# Patient Record
Sex: FEMALE | Race: WHITE | NOT HISPANIC OR LATINO | Employment: OTHER | URBAN - METROPOLITAN AREA
[De-identification: names, ages, dates, MRNs, and addresses within clinical notes are randomized per-mention and may not be internally consistent; named-entity substitution may affect disease eponyms.]

---

## 2022-06-18 ENCOUNTER — HOSPITAL ENCOUNTER (EMERGENCY)
Facility: CLINIC | Age: 67
Discharge: HOME | End: 2022-06-18
Attending: EMERGENCY MEDICINE | Admitting: EMERGENCY MEDICINE
Payer: COMMERCIAL

## 2022-06-18 VITALS
OXYGEN SATURATION: 98 % | DIASTOLIC BLOOD PRESSURE: 76 MMHG | HEART RATE: 79 BPM | SYSTOLIC BLOOD PRESSURE: 148 MMHG | RESPIRATION RATE: 20 BRPM | TEMPERATURE: 97.9 F

## 2022-06-18 DIAGNOSIS — H33.22 RETINAL DETACHMENT, LEFT: ICD-10-CM

## 2022-06-18 DIAGNOSIS — H43.392 VISUAL FLOATERS, LEFT: ICD-10-CM

## 2022-06-18 PROCEDURE — 76512 OPH US DX B-SCAN: CPT

## 2022-06-18 PROCEDURE — 99284 EMERGENCY DEPT VISIT MOD MDM: CPT | Mod: 25

## 2022-06-18 ASSESSMENT — ENCOUNTER SYMPTOMS: EYE PAIN: 0

## 2022-06-19 NOTE — ED TRIAGE NOTES
"Pt arrives to ED with c/o L vision changes at 1630. Pt had \"a curtain fall and floaters\" in her L visual field. Hx cataract surgery 3 years ago. RA on immunosuppressants. Denies pain. Denies nausea,m dizziness, or HA.     "

## 2022-06-19 NOTE — ED PROVIDER NOTES
"  History   Chief Complaint:  Loss of Vision     The history is provided by the patient and a relative (daughter).      Katie Drake is a 66 year old female with history of hypertension, osteoarthritis, bilateral cataracts, hyperlipidemia, dyspepsia, hypokalemia, depression, aortic regurgitation, stress induced cardiomyopathy, drug induced immunodeficiency, and prolonged QT Interval who presents with loss of vision. The patient states she was walking around a 1950s car show at the [facility] when she noticed she had some \"big floaters\" in her left eye. She states that she went to dinner and felt fine, but the blurry vision then returned. Her daughter notes she had a retinal tear last October that was repaired.  Patient states today's symptoms are not as black as that time.  There is no associated eye pain or headache.  No halos.  She states that she takes her hypertension medications as prescribed and notes that the dosage was doubled this week. The patient reports a past cataract surgery.  BP elevated in triage, denies chest pain, arm or leg weakness or numbness.    Review of Systems   Eyes: Positive for visual disturbance. Negative for pain.   All other systems reviewed and are negative.    Allergies:  No Known Drug Allergies    Medications:  Naprosyn  Desyrel  Crestor  CPAP  Cymbalta  Prilosec  Maxalt  Hygroton  Xeljanz  Azulfidine    Past Medical History:     Primary Osteoarthritis Knee Left  Age Related Nuclear Cataract Bilateral  Hyperlipidemia  Dyspepsia  Depression   Regurgitation Aortic  Cardiomyopathy Stress Induced  Immunodeficiency Due To Drugs  Hypertensive Heart Disease Without Heart Failure  Migraine Headache Chronic  Arthritis Rheumatoid Seronegative  KEARA  Osteopenia  Tear Rotator Cuff Initial Right  Rhinitis Allergic  Prolonged QT Interval  Cholecystitis  GERD  Hypokalemia    Past Surgical History:    Right TKA  Cholecystectomy  Right knee arthroscopic partial lateral meniscectomy  Bilateral " carpal tunnel release  Tubal ligation  Tonsillectomy and adenoidectomy  Bilateral IOL  Left TKA  Hysterectomy  Retinal laser procedure     Family History:    Brother: celiac disease, learning disorder  Father: kidney transplant, lung cancer  Mother: emphysema, smoker    Social History:  The patient presents to the ED with her daughter  Lives in [place], [place]  Daughter lives in [place], [place]  PCP: Jennifer Nevarez M.D., M.A.     Physical Exam     Patient Vitals for the past 24 hrs:   BP Temp Temp src Pulse Resp SpO2   06/18/22 1947 (!) 148/76 -- -- 79 -- --   06/18/22 1922 (!) 178/93 -- -- -- -- --   06/18/22 1919 -- 97.9  F (36.6  C) Temporal 86 20 98 %     Physical Exam  Eyes:  Sclera white, PERRL, EOMI w/o deficit    Left: fundoscopy due to non-dilated exam, visualized vessels crisp    VA: 2/25 and 20/50  Resp:  Non-labored  Neuro:  Alert and cooperative  MSkel:  Moving all extremities  Skin:  No rash, bruise on L arm she states is from her pet    Emergency Department Course     Imaging:  POC US SOFT TISSUE   Final Result   Limited Bedside Ocular Ultrasound      Performed by: Dr. Chirinos   Indication: Visual changes   Body area(s) imaged: L eye   Findings: hypodense punctate abnormalities in vitreous, in one view a small hypoechoic irregularity near retina superiorly, in another a linear hypoechoic irregularity parallel to back of eye   Impression: Concern for vitreous hemorrhage and retinal detachment   Images archived to PACS           Report per radiology    Emergency Department Course:         Reviewed:  I reviewed nursing notes, vitals, past medical history and Care Everywhere    Assessments:  1932 I obtained history and examined the patient as noted above.     Disposition:  The patient was discharged to home.     Impression & Plan     Medical Decision Making:  Visual acuity is asymmetric.  Ocular ultrasound is consistent with floaters with concern for retinal detachment.  I discussed consulting the  [facility] ophthalmology group to get follow-up tomorrow on Sunday.  However she would like to return to her providers at [place].  She will try the after-hours number tonight.  Her backup plan is to go to the emergency department if having difficulty arranging follow-up.  She was also given contact information for the [facility].  Avoid aspirin and NSAIDs prior to ophthalmology assessment.    Critical Care Time: none    Diagnosis:    ICD-10-CM    1. Visual floaters, left  H43.392    2. Retinal detachment, left - suspected  H33.22        Scribe Disclosure:  I, Keshav Davis, am serving as a scribe at 7:32 PM on 6/18/2022 to document services personally performed by Christine Chirinos MD based on my observations and the provider's statements to me.        Christine Chirinos MD  06/19/22 [id number]